# Patient Record
Sex: MALE | Race: WHITE
[De-identification: names, ages, dates, MRNs, and addresses within clinical notes are randomized per-mention and may not be internally consistent; named-entity substitution may affect disease eponyms.]

---

## 2019-03-12 ENCOUNTER — RECORD ABSTRACTING (OUTPATIENT)
Age: 64
End: 2019-03-12

## 2019-03-12 DIAGNOSIS — Z87.442 PERSONAL HISTORY OF URINARY CALCULI: ICD-10-CM

## 2019-03-12 DIAGNOSIS — Z84.89 FAMILY HISTORY OF OTHER SPECIFIED CONDITIONS: ICD-10-CM

## 2019-03-12 PROBLEM — Z00.00 ENCOUNTER FOR PREVENTIVE HEALTH EXAMINATION: Status: ACTIVE | Noted: 2019-03-12

## 2019-03-12 RX ORDER — HYDROCODONE BITARTRATE AND ACETAMINOPHEN 5; 300 MG/1; MG/1
5-300 TABLET ORAL
Refills: 0 | Status: ACTIVE | COMMUNITY

## 2019-03-12 RX ORDER — OXYCODONE HYDROCHLORIDE AND ACETAMINOPHEN 5; 325 MG/1; MG/1
5-325 TABLET ORAL
Refills: 0 | Status: ACTIVE | COMMUNITY

## 2019-03-12 RX ORDER — NABUMETONE 500 MG/1
500 TABLET, FILM COATED ORAL
Refills: 0 | Status: ACTIVE | COMMUNITY

## 2019-03-27 ENCOUNTER — APPOINTMENT (OUTPATIENT)
Dept: ORTHOPEDIC SURGERY | Facility: CLINIC | Age: 64
End: 2019-03-27
Payer: COMMERCIAL

## 2019-03-27 PROCEDURE — 99024 POSTOP FOLLOW-UP VISIT: CPT

## 2019-03-27 NOTE — HISTORY OF PRESENT ILLNESS
[___ Weeks Post Op] : [unfilled] weeks post op [5] : the patient reports pain that is 5/10 in severity [de-identified] : RIGHT SHOULDER SURGERY [de-identified] : PATIENT REPORTS BAD REACTION TO PAIN KILLERS. MILD TO KNOW SWELLING, JUST A LITTLE  [de-identified] : On exam of the right shoulder. Patient has good flexion actively to 90° abduction to 70° and internal rotation to 80° and external rotation to 20° passive range of motion demonstrates 4 flexion to 100° and external rotation to 20°. He is intact 5 out of 5 external rotation and supination his strength.  Incisions are well-healed [de-identified] : Patient is now just shy of 4 weeks status post a right shoulder arthroscopy of her trigger for tear. Recommend patient to wean out of sling and begin physical therapy in 5 days. Patient is to followup in 3 weeks. Activity restrictions discussed

## 2019-04-24 ENCOUNTER — APPOINTMENT (OUTPATIENT)
Dept: ORTHOPEDIC SURGERY | Facility: CLINIC | Age: 64
End: 2019-04-24
Payer: COMMERCIAL

## 2019-04-24 DIAGNOSIS — M75.121 COMPLETE ROTATOR CUFF TEAR OR RUPTURE OF RIGHT SHOULDER, NOT SPECIFIED AS TRAUMATIC: ICD-10-CM

## 2019-04-24 PROCEDURE — 99024 POSTOP FOLLOW-UP VISIT: CPT

## 2019-04-24 NOTE — HISTORY OF PRESENT ILLNESS
[___ Weeks Post Op] : [unfilled] weeks post op [5] : the patient reports pain that is 5/10 in severity [de-identified] : s/p RIGHT shoulder arthroscopy with RTC repair [de-identified] : Patient is now 7 weeks status post a right shoulder rotator cuff repair. He states overall he is doing rather well with minimal pain. He is undergoing physical therapy 3 times a week and occasional home exercises. [de-identified] :  exam of the right shoulder all incisions are well-healed. There is no tenderness to palpation throughout.  Patient demonstrates active forward flexion to 180° external rotation to 70° actively passively to 85 there is full in chart internal rotation to 85° both actively and passively. The patient has 5 out of 5 internal rotation and external rotation as well as supraspinatus strength. There is a negative Neer negative Howard. [de-identified] : s/p RIGHT shoulder arthroscopy with RTC repair [de-identified] : Discussed at length with the patient overall clinical improvement and recommend to continue home exercises and wean out of physical therapy over the ensuing weeks. Patient to follow up in one month on an as-needed basis. Discussed at length activities restrictions with the patient.

## 2025-04-01 ENCOUNTER — APPOINTMENT (OUTPATIENT)
Dept: ORTHOPEDIC SURGERY | Facility: CLINIC | Age: 70
End: 2025-04-01
Payer: MEDICARE

## 2025-04-01 DIAGNOSIS — M17.11 UNILATERAL PRIMARY OSTEOARTHRITIS, RIGHT KNEE: ICD-10-CM

## 2025-04-01 PROCEDURE — 99203 OFFICE O/P NEW LOW 30 MIN: CPT

## 2025-04-01 PROCEDURE — 73562 X-RAY EXAM OF KNEE 3: CPT | Mod: RT

## 2025-04-07 PROBLEM — M17.11 ARTHRITIS OF KNEE, RIGHT: Status: ACTIVE | Noted: 2025-04-07
